# Patient Record
Sex: FEMALE | URBAN - METROPOLITAN AREA
[De-identification: names, ages, dates, MRNs, and addresses within clinical notes are randomized per-mention and may not be internally consistent; named-entity substitution may affect disease eponyms.]

---

## 2024-09-18 ENCOUNTER — APPOINTMENT (RX ONLY)
Dept: URBAN - METROPOLITAN AREA CLINIC 151 | Facility: CLINIC | Age: 29
Setting detail: DERMATOLOGY
End: 2024-09-18

## 2024-09-18 DIAGNOSIS — L75.1 CHROMHIDROSIS: ICD-10-CM

## 2024-09-18 PROCEDURE — ? COUNSELING

## 2024-09-18 PROCEDURE — 99202 OFFICE O/P NEW SF 15 MIN: CPT

## 2024-09-18 PROCEDURE — ? DIAGNOSIS COMMENT

## 2024-09-18 NOTE — PROCEDURE: DIAGNOSIS COMMENT
Render Risk Assessment In Note?: no
Comment: Present on thighs and buttocks. Pt photos show blue tinge to the toilet seat cover. Nature and etiology discussed. Pt states it does not currently bother her, but recommended treatments if it does to decrease sweating and thus the color change. This would include Dove dry spray QHS, glycopyrrolate, Botox injections.
Detail Level: Zone

## 2024-09-18 NOTE — HPI: OTHER
Condition:: Colored sweat
Please Describe Your Condition:: Pt is here today, states that her sweat is blue. She notes this has been ongoing for years. It is only present on her thigh and buttocks area. She is not concerned with treatment, only wants to make sure there aren’t any underlying issues.